# Patient Record
Sex: FEMALE | Race: WHITE
[De-identification: names, ages, dates, MRNs, and addresses within clinical notes are randomized per-mention and may not be internally consistent; named-entity substitution may affect disease eponyms.]

---

## 2019-06-03 ENCOUNTER — HOSPITAL ENCOUNTER (OUTPATIENT)
Dept: HOSPITAL 92 - BICRAD | Age: 59
Discharge: HOME | End: 2019-06-03
Payer: COMMERCIAL

## 2019-06-03 DIAGNOSIS — M17.32: ICD-10-CM

## 2019-06-03 DIAGNOSIS — M51.36: ICD-10-CM

## 2019-06-03 DIAGNOSIS — Z02.71: Primary | ICD-10-CM

## 2019-06-03 DIAGNOSIS — M47.816: ICD-10-CM

## 2019-06-03 PROCEDURE — 71046 X-RAY EXAM CHEST 2 VIEWS: CPT

## 2019-06-03 PROCEDURE — 72100 X-RAY EXAM L-S SPINE 2/3 VWS: CPT

## 2019-06-03 NOTE — RAD
XR Knee Lt 2 View:



 6/3/2019 12:00 AM



CLINICAL INDICATION: Disability examination with left knee pain



COMPARISON: None.



FINDINGS:



Bones:  No acute fracture or subluxation demonstrated. 

Joints: There is a prominent medial femorotibial joint compartmental narrowing. There are marginal os
teophytes affecting all major compartments of the left knee. There is a moderate joint capsular

distention.. 

Soft Tissue: .

  

IMPRESSION: 

Moderate to severe osteoarthrosis of the left knee. 



Reported By: Joseph Dalal 

Electronically Signed:  6/3/2019 11:52 AM

## 2019-06-03 NOTE — RAD
RADIOGRAPH LUMBAR SPINE 3 VIEWS:



DATE:

6/3/2019



HISTORY:

58-year-old female with chronic low back pain. Disability.



COMPARISON:

None available



FINDINGS:

There are 5 lumbar-type vertebrae. Vertebral body heights are maintained. Left lateral curvature with
 apex at L3-4. Chronic grade 1 left lateral subluxation of L3 on L4. Minimal grade 1

anterolisthesis of L2 on L3, and mild grade 1 anterolisthesis of L3 on L4. Moderate asymmetrically ri
ght-sided disc space narrowing at L3-4 related to the curvature. Degenerative facet changes at

lower levels.



IMPRESSION:

Lumbar spondylosis with levoscoliosis, asymmetrically high-grade right-sided degenerative disc diseas
e at L3-4, and lower level facet arthrosis.



Reported By: Baldo Brumfield 

Electronically Signed:  6/3/2019 11:55 AM

## 2019-06-03 NOTE — RAD
EXAM:

Chest 2 views:



HISTORY:

Disability exam; chest pain



COMPARISON:

None.



FINDINGS:

There is a normal-sized cardiomediastinal silhouette.  Atherosclerotic calcifications are seen in the
 aorta. Biapical pleural thickening is seen. There is no evidence of consolidation, mass, or

pleural effusion. The bones are unremarkable. 





IMPRESSION:

No evidence of acute cardiopulmonary disease



Reported By: Scott Ortiz 

Electronically Signed:  6/3/2019 11:50 AM